# Patient Record
Sex: MALE | URBAN - METROPOLITAN AREA
[De-identification: names, ages, dates, MRNs, and addresses within clinical notes are randomized per-mention and may not be internally consistent; named-entity substitution may affect disease eponyms.]

---

## 2021-03-11 ENCOUNTER — HOSPITAL ENCOUNTER (OUTPATIENT)
Dept: RADIOLOGY | Facility: MEDICAL CENTER | Age: 41
End: 2021-03-11

## 2025-03-05 ENCOUNTER — HOSPITAL ENCOUNTER (EMERGENCY)
Facility: MEDICAL CENTER | Age: 45
End: 2025-03-05
Attending: EMERGENCY MEDICINE
Payer: COMMERCIAL

## 2025-03-05 VITALS
BODY MASS INDEX: 31.65 KG/M2 | SYSTOLIC BLOOD PRESSURE: 151 MMHG | HEART RATE: 82 BPM | HEIGHT: 65 IN | TEMPERATURE: 96.9 F | RESPIRATION RATE: 18 BRPM | OXYGEN SATURATION: 96 % | WEIGHT: 190 LBS | DIASTOLIC BLOOD PRESSURE: 94 MMHG

## 2025-03-05 DIAGNOSIS — H53.8 BLURRED VISION: Primary | ICD-10-CM

## 2025-03-05 PROCEDURE — 99282 EMERGENCY DEPT VISIT SF MDM: CPT

## 2025-03-05 RX ORDER — ASPIRIN 81 MG/1
81 TABLET, CHEWABLE ORAL DAILY
Qty: 100 TABLET | Refills: 0 | Status: SHIPPED | OUTPATIENT
Start: 2025-03-05

## 2025-03-05 NOTE — DISCHARGE INSTRUCTIONS
As we discussed, I wanted to check an MRI of your brain today, this can look for small strokes or other things like MS.  I understand that that you did not want to be admitted, or have to wait a significant amount of time for the MRI and therefore I placed the MRI order as an outpatient.  It is very important that you have this done within the next week.  Have also placed a referral for ophthalmology.  If you develop focal weakness or numbness on one side your body, severe headache or any other concerns please return to the emergency department.

## 2025-03-05 NOTE — ED NOTES
Discharge teaching and paperwork provided regarding diagnoses and all questions/concerns answered. VSS, assessment stable and patient was given information regarding home care and reasons to follow up with ED or primary MD. Patient provided Rx to  at their desired pharmacy. Patient discharged to the care of self and ambulated out of the ED.

## 2025-03-05 NOTE — ED PROVIDER NOTES
ED Provider Note    CHIEF COMPLAINT  Chief Complaint   Patient presents with    Blurred Vision     Ambulates to triage reports blurred vision in both eyes since Monday. Denies head ache. Aaox4. Gcs of 15         HPI/ROS    Asher Ceja is a 45 y.o. male who presents with chief complaint of blurred vision.  Patient reports symptoms since Monday.  Blurred vision is in both eyes.  Patient denies any other major medical runs.  He denies any polyuria, polydipsia, or significant weight loss.  Patient reports he is otherwise healthy.  Patient denies any focal weakness or numbness.  Denies any significant headache.  Patient denies associated nausea or vomiting.  Patient denies any head trauma.  Patient reports history of Lyme disease when he was much younger and living in [place].  He denies any other medical problems.  Patient denies any Associated history of VTE or cancer.  Patient was seen at outside hospital for these problems yesterday, had basic labs which were checked and a CT head which he said was all reassuring.    PAST MEDICAL HISTORY       SURGICAL HISTORY  patient denies any surgical history    FAMILY HISTORY  History reviewed. No pertinent family history.    SOCIAL HISTORY  Social History     Tobacco Use    Smoking status: Never    Smokeless tobacco: Not on file   Substance and Sexual Activity    Alcohol use: Yes     Comment: 2 beers    Drug use: Never    Sexual activity: Not on file       CURRENT MEDICATIONS  Home Medications       Reviewed by Radha Thomas R.N. (Registered Nurse) on 03/05/25 at 1128  Med List Status: Partial     Medication Last Dose Status        Patient Micheal Taking any Medications                           ALLERGIES  Allergies   Allergen Reactions    Diovan [Valsartan] Unspecified     Leg cramps    Penicillins Swelling     Swelling and rash    Reglan [Metoclopramide] Unspecified     Uncontrolled body movement       PHYSICAL EXAM  VITAL SIGNS: BP (!) 151/94   Pulse 82   Temp 36.1 °C  "(96.9 °F) (Temporal)   Resp 18   Ht 1.651 m (5' 5\")   Wt 86.2 kg (190 lb)   SpO2 96%   BMI 31.62 kg/m²    Physical Exam  Constitutional:       Appearance: Normal appearance.   HENT:      Head: Normocephalic.      Right Ear: Tympanic membrane normal.      Left Ear: Tympanic membrane normal.      Nose: Nose normal.      Mouth/Throat:      Mouth: Mucous membranes are moist.   Eyes:      Extraocular Movements: Extraocular movements intact.      Pupils: Pupils are equal, round, and reactive to light.      Comments: There is no conjunctival injection, there is no obvious strabismus or disconjugate gaze.  When looking down patient reports his blurred vision is mildly worse.  Patient denies any associated shortness of breath.  Blurry vision improves when he closes 1 eye.  Vision is 20/40 bilaterally.  Conjunctiva is unremarkable, no surgery chemosis.  Pupils are equal reactive.   Cardiovascular:      Rate and Rhythm: Normal rate and regular rhythm.   Pulmonary:      Effort: Pulmonary effort is normal. No respiratory distress.      Breath sounds: Normal breath sounds. No stridor. No wheezing or rales.   Chest:      Chest wall: No tenderness.   Abdominal:      General: Abdomen is flat. There is no distension.      Palpations: Abdomen is soft. There is no mass.      Tenderness: There is no abdominal tenderness.   Musculoskeletal:      Cervical back: Normal range of motion.   Skin:     General: Skin is warm.      Capillary Refill: Capillary refill takes less than 2 seconds.   Neurological:      General: No focal deficit present.      Mental Status: He is alert and oriented to person, place, and time.      Comments: Distal and proximal strength 5/5 in upper and lower extremities. Normal gait. No dysmetria. No sensation deficits. No visual field deficits. Cranial nerves intact.        Psychiatric:         Mood and Affect: Mood normal.           COURSE & MEDICAL DECISION MAKING    ASSESSMENT, COURSE AND PLAN  Care Narrative: " Patient here with some blurred vision.  He has a very reassuring exam otherwise, no other neurologic complaints at this point.  His neurologic exam is entirely benign otherwise.  He had a recent workup for this within the last 24 hours with normal labs and a normal head CT.  My suspicion of metabolic etiology therefore is very low, space-occupying mass also very unlikely.  Given the blood pressure and is worse with bilateral vision, I did consider possible MS or a CVA as a cause but believe this is highly unlikely given patient's exam is entirely normal without any strabismus or disconjugate gaze on exam.  And he has no other neurologic findings.  He had a recent head CT which was reassuring, but did offer to check an MRI here for further evaluation.  Patient does not want to wait for the MRI, and will rather do this as an outpatient.  I believe this is reasonable given my low clinical suspicion, we will start him on aspirin in the interim.  He understands that he feels are not able to get this MRI done within the next few days to return to the emergency department.  He does report that he will follow-up closely with his primary care provider.              DISPOSITION AND DISCUSSIONS      Escalation of care considered, and ultimately not performed:Laboratory analysis and diagnostic imaging were both deferred here in the emergency department, patient did not want to wait for his MRI so an outpatient order was placed.  Patient with recent labs checked within the last 24 hours are all very reassuring, CT head normal, therefore metabolic etiology or space-occupying mass or bleed was thought to be highly unlikely    FINAL DIAGNOSIS  1. Blurred vision  MR-BRAIN-WITH & W/O    Referral to Ophthalmology    aspirin (ASA) 81 MG Chew Tab chewable tablet

## 2025-03-05 NOTE — ED TRIAGE NOTES
Chief Complaint   Patient presents with    Blurred Vision     Ambulates to triage reports blurred vision in both eyes since Monday. Denies head ache. Aaox4. Gcs of 15     No facial droop, speech is clear. No unilateral symptoms. Educated on triage process. Instructed to notify staff for any worsening symptoms. Denies any recent travel. Denies exposure to known covid positive patients. Denies any respiratory symptoms.

## 2025-03-12 ENCOUNTER — HOSPITAL ENCOUNTER (OUTPATIENT)
Dept: RADIOLOGY | Facility: MEDICAL CENTER | Age: 45
End: 2025-03-12
Payer: COMMERCIAL

## 2025-03-12 NOTE — PROGRESS NOTES
Peds/Neuro Ophthalmology:   Yi Omalley M.D.    Date & Time note created:    3/13/2025   1:02 PM     Referring MD / APRN:  Ashley Quiroz M.D., No att. providers found    Patient ID:  Name:             Asher Ceja     YOB: 1980  Age:                 45 y.o.  male   MRN:               [id number]    Chief Complaint/Reason for Visit:     Other (Double vision)      History of Present Illness:      Asher Ceja is a 44 yo man referred for double vision in the setting of a midbrain lesion    He is referred by his PCP.   Asher woke up with double vision 3/3/25. Double vision is oblique and overlapping. The double resolves with closure of either eye and is constant. It is worse at near. He denies any history of lazy eye. He currently denies any headache, eye pain. MRI brain wwo was completed 3/7/25 and demonstrated an enhancing left medial midbrain lesion. Poor study to evaluate optic nerves. He was evaluated by [facility] retina where exam 3/11/25 demonstrated VA 20/20-2 OD 20/20-3 OS, no APD, normal optic nerves    Of note patient reports a history of lyme's disease 2008, 2010, 2012.  He reports he had neurological involvement in 2010 (CSF positive). He was treated by an infectious disease doctor in [place] after each episode. Per patient he has positive lyme again based on the labs by his PCP    Asher denies history of autoimmune disease. PMH of bicuspid aortic valve s/p replacement on warfarin    Vascular history: HTN (130/90), HLD, smoking history (18 pack year smoking history, quit 2022)        Review of Systems:  ROS    Past Medical History:   History reviewed. No pertinent past medical history.    Past Surgical History:  History reviewed. No pertinent surgical history.    Current Outpatient Medications:  Current Outpatient Medications   Medication Sig Dispense Refill    warfarin (COUMADIN) 10 MG Tab Take 10 mg by mouth every day.      atorvastatin (LIPITOR) 20 MG Tab Take 20 mg by mouth every  evening.      metoprolol tartrate (LOPRESSOR) 100 MG Tab Take 100 mg by mouth 2 times a day.      amLODIPine (NORVASC) 10 MG Tab Take 10 mg by mouth every day.      lisinopril (PRINIVIL) 10 MG Tab Take 10 mg by mouth every day.      aspirin (ASA) 81 MG Chew Tab chewable tablet Chew 1 Tablet every day. (Patient not taking: Reported on 3/13/2025) 100 Tablet 0     No current facility-administered medications for this visit.       Allergies:  Allergies   Allergen Reactions    Diovan [Valsartan] Unspecified     Leg cramps    Penicillins Swelling     Swelling and rash    Reglan [Metoclopramide] Unspecified     Uncontrolled body movement       Family History:  History reviewed. No pertinent family history.    Social History:  Social History     Socioeconomic History    Marital status: Single     Spouse name: Not on file    Number of children: Not on file    Years of education: Not on file    Highest education level: Not on file   Occupational History    Not on file   Tobacco Use    Smoking status: Never    Smokeless tobacco: Never   Substance and Sexual Activity    Alcohol use: Yes     Comment: 2 beers    Drug use: Never    Sexual activity: Not on file   Other Topics Concern    Not on file   Social History Narrative         Social Drivers of Health     Financial Resource Strain: Not on file   Food Insecurity: Not on file   Transportation Needs: Not on file   Physical Activity: Not on file   Stress: Not on file   Social Connections: Not on file   Intimate Partner Violence: Not on file   Housing Stability: Not on file          Physical Exam:  Physical Exam    Oriented x 3  Weight/BMI: There is no height or weight on file to calculate BMI.  There were no vitals taken for this visit.    Base Eye Exam       Visual Acuity (Snellen - Linear)         Right Left    Dist sc 20/25-2 20/30+1    Dist ph sc  20/20 +2    Near sc J3 J3              Tonometry ([facility], 8:16 AM)         Right Left    Pressure 16 16               Pupils         Pupils Dark Light Shape React    Right PERRL 4 2 Round Brisk    Left PERRL 4 2 Round Brisk              Visual Fields         Right Left     Full Full              Extraocular Movement         Right Left     Full Full              Neuro/Psych       Oriented x3: Yes    Mood/Affect: Normal                  Additional Tests       Color         Right Left    Ishihara 1/9 1/9   Control plate only  Reports history of color blindness             Stereo       Fly: +    Animals: 1/3    Circles: 1/9                  Strabismus Exam       Reading #1   (Edited by: Yi Omalley M.D.)      Method: Alternate cover    Correction: sc      Distance Near Near +3DS N Bifocals                      - - - - - -  Ortho  - - - - - -                      Ortho  - -  - -  Ortho  - -  - -  Ortho                      - - - - - -  RH 2 - - - - - -                   R Tilt L Tilt   Ortho  Ortho                               Reading #2   (Edited by: Yi Omalley M.D.)      Method: Alternate cover    Correction: sc      Distance Near Near +3DS N Bifocals                      - - - - - -  E 2 - - - - - -            RH 1          E 2-4 - -  - -  E 2 - -  - -  E 2     RH 1     RH 1     RH 1      - - - - - -  ET 8 - - - - - -          RH 5         R Tilt L Tilt   RH 6 RH 3                              Comments    Subjectively prefers RH1 OD  Alignment testing suspicious for skew deviation                  Slit Lamp and Fundus Exam       External Exam         Right Left    External Normal Normal              Slit Lamp Exam         Right Left    Lids/Lashes Normal Normal    Conjunctiva/Sclera White and quiet White and quiet    Cornea Clear Clear    Anterior Chamber Deep and quiet Deep and quiet    Iris Round and reactive Round and reactive    Lens Clear Clear              Fundus Exam         Right Left    Disc pink, sharp disc margins, flat pink, sharp disc margins, flat    C/D Ratio 0.2 0.2    Macula Normal Normal                   Refraction       Manifest Refraction (Auto)         Sphere Cylinder Axis    Right -0.75 +1.50 021    Left -0.50 +1.25 153                    Pertinent Lab/Test/Imaging Review:  MRI brain wwo 3/7/25: non enhancing L periventricular lesion in the frontal lobe. Enhancing L medial midbrian lesion. Poor quality image to visualize optic nerves    Labs 3/12/25  EDU neg  Lyme Ab 3.02 H   Lyme Immunoblot IgG reactive 18KD, 39KD, 41KD, 58KD, 93KD  Lyme immunoblot IgM reactive 39KD    Assessment and Plan:     Demyelinating disease (HCC)  MRI reviewed and demonstrated a non enhancing lesion in the periventricular region of the L frontal lobe, and an enhancing lesion in the medial L midbrain. Appears consistent with MS however patient is an atypical demographic. Given recent +lyme titers and prior reported history of CNS lyme, recommend LP with CSF studies. Referral sent neuroimmunology     Double vision  44 yo man with PMH of lymes disease x3 who presents for new patient evaluation of double vision     Reports onset of double vision 3/3/25. Described as oblique and overlapping, binocular. Constant since onset. No eye pain, headache. MRI brain wwo demonstrated a nonenhancing lesion in the L periventricular frontal lobe, and an enhancing L medial midbrain lesion. Pt testing positive for Lyme IgG and IgM     Exam 3/13/25: no APD, VA 20/25-2 OD 20/20+2 PH OS, HVF normal OD occasional superior misses w/ low test reliability OS. Full EOM. Incomitant RHT worse on downgaze, R head tilt. Optic nerves pink with sharp disc margins. RNFL 93 OD 92 OS     Plan:   Exam demonstrates an incomitant RHT that does not fit a cranial nerve palsy. Given the enhancing midbrain lesion, I question whether patient has a skew deviation. No findings of a 3rd nerve palsy on exam today. Given positive lyme antibodies, will recommended LP with CSF studies to further differentiate between demyelinating disease vs infectious. MRI reviewed and lesions appear  typical for MS. Will send to neuro immunology for further evaluation. Hold on steroids until active lyme disease ruled out     -LP with opening pressures  -CSF studies: CSF cell/diff, cultures, OCB, lyme, IgG index, cytology, flow cytometry, protein, glucose   -Referral to Neuro immunology   -return for fresnel trial 1 BD OD ; pt forgot glasses today   -RTC in 4 weeks   -Labs CALEB, hepatitis panel, CNS demyelinating, ESR/CRP, RPR         Color blindness  Reports color blindness since childhood  Mike 1/9 MICHAEL Omalley M.D.    88 total minutes were spent reviewing imaging, records, examining the patient and documenting.

## 2025-03-13 ENCOUNTER — OFFICE VISIT (OUTPATIENT)
Dept: OPHTHALMOLOGY | Facility: MEDICAL CENTER | Age: 45
End: 2025-03-13
Payer: COMMERCIAL

## 2025-03-13 DIAGNOSIS — G37.9 DEMYELINATING DISEASE (HCC): ICD-10-CM

## 2025-03-13 DIAGNOSIS — H53.50 COLOR BLINDNESS: ICD-10-CM

## 2025-03-13 DIAGNOSIS — H53.2 DOUBLE VISION: ICD-10-CM

## 2025-03-13 PROCEDURE — 92083 EXTENDED VISUAL FIELD XM: CPT | Performed by: STUDENT IN AN ORGANIZED HEALTH CARE EDUCATION/TRAINING PROGRAM

## 2025-03-13 PROCEDURE — 92250 FUNDUS PHOTOGRAPHY W/I&R: CPT | Performed by: STUDENT IN AN ORGANIZED HEALTH CARE EDUCATION/TRAINING PROGRAM

## 2025-03-13 PROCEDURE — 99205 OFFICE O/P NEW HI 60 MIN: CPT | Mod: 25 | Performed by: STUDENT IN AN ORGANIZED HEALTH CARE EDUCATION/TRAINING PROGRAM

## 2025-03-13 PROCEDURE — 92060 SENSORIMOTOR EXAMINATION: CPT | Performed by: STUDENT IN AN ORGANIZED HEALTH CARE EDUCATION/TRAINING PROGRAM

## 2025-03-13 RX ORDER — WARFARIN SODIUM 10 MG/1
10 TABLET ORAL DAILY
COMMUNITY

## 2025-03-13 RX ORDER — LISINOPRIL 10 MG/1
10 TABLET ORAL DAILY
COMMUNITY

## 2025-03-13 RX ORDER — METOPROLOL TARTRATE 100 MG/1
100 TABLET ORAL 2 TIMES DAILY
COMMUNITY

## 2025-03-13 RX ORDER — AMLODIPINE BESYLATE 10 MG/1
10 TABLET ORAL DAILY
COMMUNITY

## 2025-03-13 RX ORDER — ATORVASTATIN CALCIUM 20 MG/1
20 TABLET, FILM COATED ORAL NIGHTLY
COMMUNITY

## 2025-03-13 ASSESSMENT — VISUAL ACUITY
OS_SC: 20/30+1
OS_SC: J3
METHOD: SNELLEN - LINEAR
OS_PH_SC: 20/20
OD_SC: J3
OD_SC: 20/25-2
OS_PH_SC+: +2

## 2025-03-13 ASSESSMENT — CUP TO DISC RATIO
OS_RATIO: 0.2
OD_RATIO: 0.2

## 2025-03-13 ASSESSMENT — REFRACTION_MANIFEST
OS_CYLINDER: +1.25
OS_AXIS: 153
OD_SPHERE: -0.75
OD_AXIS: 021
OS_SPHERE: -0.50
METHOD_AUTOREFRACTION: 1
OD_CYLINDER: +1.50

## 2025-03-13 ASSESSMENT — CONF VISUAL FIELD
OD_INFERIOR_NASAL_RESTRICTION: 0
OS_NORMAL: 1
OS_SUPERIOR_NASAL_RESTRICTION: 0
OS_INFERIOR_TEMPORAL_RESTRICTION: 0
OD_INFERIOR_TEMPORAL_RESTRICTION: 0
OD_SUPERIOR_NASAL_RESTRICTION: 0
OD_NORMAL: 1
OS_INFERIOR_NASAL_RESTRICTION: 0
OD_SUPERIOR_TEMPORAL_RESTRICTION: 0
OS_SUPERIOR_TEMPORAL_RESTRICTION: 0

## 2025-03-13 ASSESSMENT — SLIT LAMP EXAM - LIDS
COMMENTS: NORMAL
COMMENTS: NORMAL

## 2025-03-13 ASSESSMENT — ENCOUNTER SYMPTOMS: DOUBLE VISION: 1

## 2025-03-13 ASSESSMENT — EXTERNAL EXAM - LEFT EYE: OS_EXAM: NORMAL

## 2025-03-13 ASSESSMENT — TONOMETRY
OD_IOP_MMHG: 16
OS_IOP_MMHG: 16

## 2025-03-13 ASSESSMENT — EXTERNAL EXAM - RIGHT EYE: OD_EXAM: NORMAL

## 2025-03-13 NOTE — ASSESSMENT & PLAN NOTE
MRI reviewed and demonstrated a non enhancing lesion in the periventricular region of the L frontal lobe, and an enhancing lesion in the medial L midbrain. Appears consistent with MS however patient is an atypical demographic. Given recent +lyme titers and prior reported history of CNS lyme, recommend LP with CSF studies. Referral sent neuroimmunology

## 2025-03-13 NOTE — PROCEDURES
OD; normal VFI 98%, MD -0.88, PSD 1.54    OS: superior misses.low test reliability. VFI 98%, MD -0.23, PSD 1.95

## 2025-03-13 NOTE — PROGRESS NOTES
Peds/Neuro Ophthalmology:   Rudy Segal    Date & Time note created:    3/13/2025   8:17 AM     Referring MD / APRN:  Ashley Quiroz M.D., No att. providers found    Patient ID:  Name:             Asher Ceja     YOB: 1980  Age:                 45 y.o.  male   MRN:               [id number]    Chief Complaint/Reason for Visit:     Other (Double vision)      History of Present Illness:    Asher Ceja is a 45 y.o. male   Other        Review of Systems:  Review of Systems   Eyes:  Positive for double vision.   All other systems reviewed and are negative.      Past Medical History:   History reviewed. No pertinent past medical history.    Past Surgical History:  History reviewed. No pertinent surgical history.    Current Outpatient Medications:  Current Outpatient Medications   Medication Sig Dispense Refill    warfarin (COUMADIN) 10 MG Tab Take 10 mg by mouth every day.      atorvastatin (LIPITOR) 20 MG Tab Take 20 mg by mouth every evening.      metoprolol tartrate (LOPRESSOR) 100 MG Tab Take 100 mg by mouth 2 times a day.      amLODIPine (NORVASC) 10 MG Tab Take 10 mg by mouth every day.      lisinopril (PRINIVIL) 10 MG Tab Take 10 mg by mouth every day.      aspirin (ASA) 81 MG Chew Tab chewable tablet Chew 1 Tablet every day. (Patient not taking: Reported on 3/13/2025) 100 Tablet 0     No current facility-administered medications for this visit.       Allergies:  Allergies   Allergen Reactions    Diovan [Valsartan] Unspecified     Leg cramps    Penicillins Swelling     Swelling and rash    Reglan [Metoclopramide] Unspecified     Uncontrolled body movement       Family History:  History reviewed. No pertinent family history.    Social History:  Social History     Socioeconomic History    Marital status: Single     Spouse name: Not on file    Number of children: Not on file    Years of education: Not on file    Highest education level: Not on file   Occupational History    Not on file    Tobacco Use    Smoking status: Never    Smokeless tobacco: Never   Substance and Sexual Activity    Alcohol use: Yes     Comment: 2 beers    Drug use: Never    Sexual activity: Not on file   Other Topics Concern    Not on file   Social History Narrative         Social Drivers of Health     Financial Resource Strain: Not on file   Food Insecurity: Not on file   Transportation Needs: Not on file   Physical Activity: Not on file   Stress: Not on file   Social Connections: Not on file   Intimate Partner Violence: Not on file   Housing Stability: Not on file          Physical Exam:  Physical Exam    Oriented x 3  Weight/BMI: There is no height or weight on file to calculate BMI.  There were no vitals taken for this visit.    Base Eye Exam       Visual Acuity (Snellen - Linear)         Right Left    Dist sc 20/25-2 20/30+1    Near sc J3 J3              Tonometry ( [facility], 8:16 AM)         Right Left    Pressure 16 16              Pupils         Pupils    Right PERRL    Left PERRL                  Additional Tests       Color         Right Left    Ishihara 0/9 0/9              Stereo       Fly: +    Animals: 1/3    Circles: 1/9                  Refraction       Manifest Refraction (Auto)         Sphere Cylinder Axis    Right -0.75 +1.50 021    Left -0.50 +1.25 153                    Pertinent Lab/Test/Imaging Review:      Assessment and Plan:     No problem-specific Assessment & Plan notes found for this encounter.        Rudy Segal

## 2025-03-19 ENCOUNTER — HOSPITAL ENCOUNTER (OUTPATIENT)
Dept: LAB | Facility: MEDICAL CENTER | Age: 45
End: 2025-03-19
Attending: STUDENT IN AN ORGANIZED HEALTH CARE EDUCATION/TRAINING PROGRAM
Payer: COMMERCIAL

## 2025-03-19 DIAGNOSIS — H53.2 DOUBLE VISION: ICD-10-CM

## 2025-03-19 DIAGNOSIS — G37.9 DEMYELINATING DISEASE (HCC): ICD-10-CM

## 2025-03-19 LAB
APTT PPP: 28 SEC (ref 24.7–36)
BASOPHILS # BLD AUTO: 0.6 % (ref 0–1.8)
BASOPHILS # BLD: 0.06 K/UL (ref 0–0.12)
CRP SERPL HS-MCNC: 1.5 MG/DL (ref 0–0.75)
EOSINOPHIL # BLD AUTO: 0.25 K/UL (ref 0–0.51)
EOSINOPHIL NFR BLD: 2.6 % (ref 0–6.9)
ERYTHROCYTE [DISTWIDTH] IN BLOOD BY AUTOMATED COUNT: 43.6 FL (ref 35.9–50)
ERYTHROCYTE [SEDIMENTATION RATE] IN BLOOD BY WESTERGREN METHOD: 4 MM/HOUR (ref 0–20)
HAV IGM SERPL QL IA: NORMAL
HBV CORE IGM SER QL: NORMAL
HBV SURFACE AG SER QL: NORMAL
HCT VFR BLD AUTO: 53.4 % (ref 42–52)
HCV AB SER QL: NORMAL
HGB BLD-MCNC: 18.1 G/DL (ref 14–18)
IMM GRANULOCYTES # BLD AUTO: 0.04 K/UL (ref 0–0.11)
IMM GRANULOCYTES NFR BLD AUTO: 0.4 % (ref 0–0.9)
INR PPP: 1 (ref 0.87–1.13)
LYMPHOCYTES # BLD AUTO: 1.28 K/UL (ref 1–4.8)
LYMPHOCYTES NFR BLD: 13.3 % (ref 22–41)
MCH RBC QN AUTO: 30.8 PG (ref 27–33)
MCHC RBC AUTO-ENTMCNC: 33.9 G/DL (ref 32.3–36.5)
MCV RBC AUTO: 91 FL (ref 81.4–97.8)
MONOCYTES # BLD AUTO: 0.78 K/UL (ref 0–0.85)
MONOCYTES NFR BLD AUTO: 8.1 % (ref 0–13.4)
NEUTROPHILS # BLD AUTO: 7.21 K/UL (ref 1.82–7.42)
NEUTROPHILS NFR BLD: 75 % (ref 44–72)
NRBC # BLD AUTO: 0 K/UL
NRBC BLD-RTO: 0 /100 WBC (ref 0–0.2)
PLATELET # BLD AUTO: 252 K/UL (ref 164–446)
PMV BLD AUTO: 12.2 FL (ref 9–12.9)
PROTHROMBIN TIME: 13.2 SEC (ref 12–14.6)
RBC # BLD AUTO: 5.87 M/UL (ref 4.7–6.1)
T PALLIDUM AB SER QL IA: NORMAL
WBC # BLD AUTO: 9.6 K/UL (ref 4.8–10.8)

## 2025-03-19 PROCEDURE — 88184 FLOWCYTOMETRY/ TC 1 MARKER: CPT

## 2025-03-19 PROCEDURE — 86362 MOG-IGG1 ANTB CBA EACH: CPT

## 2025-03-19 PROCEDURE — 85652 RBC SED RATE AUTOMATED: CPT

## 2025-03-19 PROCEDURE — 86235 NUCLEAR ANTIGEN ANTIBODY: CPT | Mod: 91

## 2025-03-19 PROCEDURE — 86780 TREPONEMA PALLIDUM: CPT

## 2025-03-19 PROCEDURE — 85610 PROTHROMBIN TIME: CPT

## 2025-03-19 PROCEDURE — 86052 AQUAPORIN-4 ANTB CBA EACH: CPT

## 2025-03-19 PROCEDURE — 86140 C-REACTIVE PROTEIN: CPT

## 2025-03-19 PROCEDURE — 85025 COMPLETE CBC W/AUTO DIFF WBC: CPT

## 2025-03-19 PROCEDURE — 88185 FLOWCYTOMETRY/TC ADD-ON: CPT

## 2025-03-19 PROCEDURE — 83516 IMMUNOASSAY NONANTIBODY: CPT | Mod: 91

## 2025-03-19 PROCEDURE — 80074 ACUTE HEPATITIS PANEL: CPT

## 2025-03-19 PROCEDURE — 36415 COLL VENOUS BLD VENIPUNCTURE: CPT

## 2025-03-19 PROCEDURE — 85730 THROMBOPLASTIN TIME PARTIAL: CPT

## 2025-03-20 ENCOUNTER — HOSPITAL ENCOUNTER (OUTPATIENT)
Dept: RADIOLOGY | Facility: MEDICAL CENTER | Age: 45
End: 2025-03-20
Attending: STUDENT IN AN ORGANIZED HEALTH CARE EDUCATION/TRAINING PROGRAM
Payer: COMMERCIAL

## 2025-03-20 ENCOUNTER — HOSPITAL ENCOUNTER (OUTPATIENT)
Facility: MEDICAL CENTER | Age: 45
End: 2025-03-20
Attending: STUDENT IN AN ORGANIZED HEALTH CARE EDUCATION/TRAINING PROGRAM
Payer: COMMERCIAL

## 2025-03-20 DIAGNOSIS — G37.9 DEMYELINATING DISEASE (HCC): ICD-10-CM

## 2025-03-20 DIAGNOSIS — H53.2 DOUBLE VISION: ICD-10-CM

## 2025-03-20 LAB
BURR CELLS/RBC NFR CSF MANUAL: 20 %
CLARITY CSF: ABNORMAL
COLOR CSF: ABNORMAL
COLOR SPUN CSF: COLORLESS
CYTOLOGY REG CYTOL: NORMAL
GLUCOSE CSF-MCNC: 64 MG/DL (ref 40–80)
GRAM STN SPEC: NORMAL
LYMPHOCYTES NFR CSF: 24 %
MONOS+MACROS NFR CSF MANUAL: 5 %
NEUTROPHILS NFR CSF: 71 %
NUC CELL # CSF: 12 CELLS/UL (ref 0–10)
PROT CSF-MCNC: 102 MG/DL (ref 15–45)
RBC # CSF: ABNORMAL CELLS/UL
SIGNIFICANT IND 70042: NORMAL
SITE SITE: NORMAL
SOURCE SOURCE: NORMAL
SPECIMEN VOL CSF: 20 ML
TUBE # CSF: 3
TUBE # CSF: ABNORMAL

## 2025-03-20 PROCEDURE — 88184 FLOWCYTOMETRY/ TC 1 MARKER: CPT

## 2025-03-20 PROCEDURE — 89051 BODY FLUID CELL COUNT: CPT

## 2025-03-20 PROCEDURE — 82042 OTHER SOURCE ALBUMIN QUAN EA: CPT

## 2025-03-20 PROCEDURE — 88108 CYTOPATH CONCENTRATE TECH: CPT | Performed by: PATHOLOGY

## 2025-03-20 PROCEDURE — 87205 SMEAR GRAM STAIN: CPT

## 2025-03-20 PROCEDURE — 87070 CULTURE OTHR SPECIMN AEROBIC: CPT

## 2025-03-20 PROCEDURE — 88185 FLOWCYTOMETRY/TC ADD-ON: CPT | Mod: 91

## 2025-03-20 PROCEDURE — 84157 ASSAY OF PROTEIN OTHER: CPT

## 2025-03-20 PROCEDURE — 88108 CYTOPATH CONCENTRATE TECH: CPT | Mod: 26 | Performed by: PATHOLOGY

## 2025-03-20 PROCEDURE — 82784 ASSAY IGA/IGD/IGG/IGM EACH: CPT

## 2025-03-20 PROCEDURE — 86618 LYME DISEASE ANTIBODY: CPT

## 2025-03-20 PROCEDURE — 82945 GLUCOSE OTHER FLUID: CPT

## 2025-03-20 PROCEDURE — 62328 DX LMBR SPI PNXR W/FLUOR/CT: CPT

## 2025-03-20 PROCEDURE — 83916 OLIGOCLONAL BANDS: CPT

## 2025-03-20 PROCEDURE — 82040 ASSAY OF SERUM ALBUMIN: CPT

## 2025-03-21 ENCOUNTER — TELEPHONE (OUTPATIENT)
Dept: OPHTHALMOLOGY | Facility: MEDICAL CENTER | Age: 45
End: 2025-03-21
Payer: COMMERCIAL

## 2025-03-21 DIAGNOSIS — H53.2 DOUBLE VISION: ICD-10-CM

## 2025-03-21 DIAGNOSIS — G37.9 DEMYELINATING DISEASE (HCC): ICD-10-CM

## 2025-03-21 LAB
ACUTE LEUKEMIA MARKERS SPEC-IMP: NORMAL
AQP4 H2O CHANNEL IGG SERPL QL IF: NORMAL
EVENTS COUNTED SPEC: 26 MARKERS
MOG AB SER QL CBA IFA: NORMAL
SOURCE 9121: NORMAL

## 2025-03-21 NOTE — TELEPHONE ENCOUNTER
Spoke to patient regarding serum and CSF results so far. Elevated CRP, CSF protein, and CSF WBC concerning for infectious/inflammatory/malignant etiology. Waiting on other CSF results. Pt continues to have double vision. Serum lyme sent from PCP office demonstrates positive IgG and IgM. Will send referral to infectious disease and general neurology given the other CNS lesions

## 2025-03-22 LAB
ACUTE LEUKEMIA MARKERS SPEC-IMP: NORMAL
ALB CSF/SERPL: 7.9 RATIO (ref 0–9)
ALBUMIN CSF-MCNC: 36 MG/DL (ref 0–35)
ALBUMIN SERPL-MCNC: 4544 MG/DL (ref 3500–5200)
CENTROMERE IGG TITR SER IF: 0 AU/ML (ref 0–40)
ENA JO1 AB TITR SER: 2 AU/ML (ref 0–40)
ENA SCL70 IGG SER QL: 1 AU/ML (ref 0–40)
ENA SM IGG SER-ACNC: 3 AU/ML (ref 0–40)
ENA SS-A 60KD AB SER-ACNC: 0 AU/ML (ref 0–40)
ENA SS-A IGG SER QL: 2 AU/ML (ref 0–40)
ENA SS-B IGG SER IA-ACNC: 0 AU/ML (ref 0–40)
EVENTS COUNTED SPEC: 16 MARKERS
IGG CSF-MCNC: 6.3 MG/DL (ref 0–6)
IGG SERPL-MCNC: 1146 MG/DL (ref 768–1632)
IGG SYNTH RATE SER+CSF CALC-MRATE: 7.2 MG/D
IGG/ALB CLEAR SER+CSF-RTO: 0.69 RATIO (ref 0.28–0.66)
IGG/ALB CSF: 0.18 RATIO (ref 0.09–0.25)
RIBOSOMAL P AB SER-ACNC: 1 AU/ML (ref 0–40)
SOURCE 9121: NORMAL
U1 SNRNP IGG SER QL: 5 UNITS (ref 0–19)

## 2025-03-23 LAB
BACTERIA CSF CULT: NORMAL
GRAM STN SPEC: NORMAL
OLIGOCLONAL BANDS CSF ELPH-IMP: NORMAL
OLIGOCLONAL BANDS CSF IEF: 0 BANDS (ref 0–1)
OLIGOCLONAL BANDS.IT SER+CSF QL: NEGATIVE
SIGNIFICANT IND 70042: NORMAL
SITE SITE: NORMAL
SOURCE SOURCE: NORMAL

## 2025-03-28 ENCOUNTER — TELEPHONE (OUTPATIENT)
Dept: OPHTHALMOLOGY | Facility: MEDICAL CENTER | Age: 45
End: 2025-03-28
Payer: COMMERCIAL

## 2025-03-28 NOTE — TELEPHONE ENCOUNTER
Called [facility] to check on urgent referral there phone lines are busy I left message with their answering service. I will attempt again today.     Called Asher to resal he call them as well to get seen urgently. I gave him their phone number. Asher will call today for appt.

## 2025-03-31 ENCOUNTER — TELEPHONE (OUTPATIENT)
Dept: OPHTHALMOLOGY | Facility: MEDICAL CENTER | Age: 45
End: 2025-03-31
Payer: COMMERCIAL

## 2025-03-31 ENCOUNTER — TELEPHONE (OUTPATIENT)
Dept: INTERNAL MEDICINE | Facility: OTHER | Age: 45
End: 2025-03-31
Payer: COMMERCIAL

## 2025-03-31 NOTE — TELEPHONE ENCOUNTER
Called the lab to see if they can add on to CSF. They no longer have the sample. Message was relayed to Dr. Omalley

## 2025-03-31 NOTE — TELEPHONE ENCOUNTER
PUSHPA Armenta redirected to someone at [phone]. Per pt. He was supposed to get a call last Friday and has not heard back. He will call them today.

## 2025-03-31 NOTE — TELEPHONE ENCOUNTER
[facility] called they said that lab   Borrelia Burgdorferi anaibody total with reflex using CFS  was hemolyzed...

## 2025-04-01 ENCOUNTER — TELEPHONE (OUTPATIENT)
Dept: OPHTHALMOLOGY | Facility: MEDICAL CENTER | Age: 45
End: 2025-04-01
Payer: COMMERCIAL

## 2025-04-01 DIAGNOSIS — H53.2 DOUBLE VISION: ICD-10-CM

## 2025-04-01 DIAGNOSIS — G37.9 DEMYELINATING DISEASE (HCC): ICD-10-CM

## 2025-04-01 DIAGNOSIS — R76.8 BORDERLINE LYME SEROLOGY: ICD-10-CM

## 2025-04-01 NOTE — TELEPHONE ENCOUNTER
Spoke to patient regarding his CSF and hemolyzed CSF Lyme. Due to the elevated CSF protein,IgG index and OCB with systemic elevated CRP and positive lyme IgG and IgM, requested repeat CSF studies. Pt is agreeable as this can determine whether steroids or antibiotics would help to improve symptoms

## 2025-04-04 ENCOUNTER — TELEPHONE (OUTPATIENT)
Dept: OPHTHALMOLOGY | Facility: MEDICAL CENTER | Age: 45
End: 2025-04-04
Payer: COMMERCIAL

## 2025-04-04 DIAGNOSIS — G37.9 DEMYELINATING DISEASE (HCC): ICD-10-CM

## 2025-04-04 DIAGNOSIS — R76.8 BORDERLINE LYME SEROLOGY: ICD-10-CM

## 2025-04-04 NOTE — TELEPHONE ENCOUNTER
Phone Number Called: [phone]    Call outcome: Left detailed message for patient. Informed to call back with any additional questions.    Message: I called patient lvm patient needs a neurology consultation before Dr Wu can see him.

## 2025-04-07 ENCOUNTER — TELEPHONE (OUTPATIENT)
Dept: OPHTHALMOLOGY | Facility: MEDICAL CENTER | Age: 45
End: 2025-04-07
Payer: COMMERCIAL

## 2025-04-07 NOTE — TELEPHONE ENCOUNTER
I talked to patient on the phone. He mentioned [facility] ID dr dick see him until he sees Neurologist. He was scheduled with Neuro for 4/10-25 and they canceled it due to Dr having emergency. New appt is 5-9-25. He will try to do a walk in with his PCP tomorrow. He mentioned he can see slightly better. Will get feedback from Dr Omalley.

## 2025-04-10 ENCOUNTER — APPOINTMENT (OUTPATIENT)
Dept: NEUROLOGY | Facility: MEDICAL CENTER | Age: 45
End: 2025-04-10
Attending: PSYCHIATRY & NEUROLOGY
Payer: COMMERCIAL

## 2025-04-18 ENCOUNTER — HOSPITAL ENCOUNTER (OUTPATIENT)
Dept: RADIOLOGY | Facility: MEDICAL CENTER | Age: 45
End: 2025-04-18
Attending: STUDENT IN AN ORGANIZED HEALTH CARE EDUCATION/TRAINING PROGRAM
Payer: COMMERCIAL

## 2025-04-25 ENCOUNTER — TELEPHONE (OUTPATIENT)
Dept: OPHTHALMOLOGY | Facility: MEDICAL CENTER | Age: 45
End: 2025-04-25
Payer: COMMERCIAL

## 2025-04-25 NOTE — TELEPHONE ENCOUNTER
Reached out to patient due to 's with [facility] notes stating patient has sudden vision loss and requesting a STAT follow up appointment.  also wanted patient back in 4 weeks. Appointments available this week with , requested patient call back to get scheduled.

## 2025-05-09 ENCOUNTER — TELEPHONE (OUTPATIENT)
Dept: NEUROLOGY | Facility: MEDICAL CENTER | Age: 45
End: 2025-05-09
Payer: COMMERCIAL

## 2025-05-09 NOTE — TELEPHONE ENCOUNTER
LVM for patient, he wanted to change today's appt to virtual. 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 Advised pt of this in vm, asked him to give us a call back.    Lolita Barr, Med Ass't

## 2025-05-12 ENCOUNTER — TELEPHONE (OUTPATIENT)
Dept: OPHTHALMOLOGY | Facility: MEDICAL CENTER | Age: 45
End: 2025-05-12
Payer: COMMERCIAL

## 2025-05-12 NOTE — TELEPHONE ENCOUNTER
Attempted to call patient and stepfather. Patient cancelled repeat LP and has not scheduled follow up appt. Pt additionally no showed neurology appt. Left voicemail to call office back     Let voicemail for PCP as well

## 2025-05-13 ENCOUNTER — TELEPHONE (OUTPATIENT)
Dept: NEUROLOGY | Facility: MEDICAL CENTER | Age: 45
End: 2025-05-13
Payer: COMMERCIAL

## 2025-06-05 ENCOUNTER — APPOINTMENT (OUTPATIENT)
Dept: OPHTHALMOLOGY | Facility: MEDICAL CENTER | Age: 45
End: 2025-06-05
Payer: COMMERCIAL

## 2025-06-30 ENCOUNTER — APPOINTMENT (OUTPATIENT)
Dept: OPHTHALMOLOGY | Facility: MEDICAL CENTER | Age: 45
End: 2025-06-30
Payer: COMMERCIAL

## 2025-08-11 ENCOUNTER — TELEPHONE (OUTPATIENT)
Dept: NEUROLOGY | Facility: MEDICAL CENTER | Age: 45
End: 2025-08-11
Payer: COMMERCIAL